# Patient Record
Sex: FEMALE | Race: WHITE | NOT HISPANIC OR LATINO | Employment: UNEMPLOYED | URBAN - NONMETROPOLITAN AREA
[De-identification: names, ages, dates, MRNs, and addresses within clinical notes are randomized per-mention and may not be internally consistent; named-entity substitution may affect disease eponyms.]

---

## 2020-11-17 ENCOUNTER — OFFICE VISIT (OUTPATIENT)
Dept: CARDIOLOGY | Facility: CLINIC | Age: 40
End: 2020-11-17

## 2020-11-17 VITALS
HEART RATE: 79 BPM | WEIGHT: 134 LBS | BODY MASS INDEX: 22.88 KG/M2 | DIASTOLIC BLOOD PRESSURE: 58 MMHG | TEMPERATURE: 97.3 F | HEIGHT: 64 IN | SYSTOLIC BLOOD PRESSURE: 92 MMHG

## 2020-11-17 DIAGNOSIS — R94.31 ABNORMAL EKG: ICD-10-CM

## 2020-11-17 DIAGNOSIS — R42 DIZZINESS: ICD-10-CM

## 2020-11-17 DIAGNOSIS — E11.9 TYPE 2 DIABETES MELLITUS WITHOUT COMPLICATION, WITHOUT LONG-TERM CURRENT USE OF INSULIN (HCC): ICD-10-CM

## 2020-11-17 DIAGNOSIS — R11.2 NAUSEA AND VOMITING IN ADULT: ICD-10-CM

## 2020-11-17 DIAGNOSIS — R06.02 SHORTNESS OF BREATH: ICD-10-CM

## 2020-11-17 DIAGNOSIS — G47.30 SLEEP APNEA IN ADULT: ICD-10-CM

## 2020-11-17 DIAGNOSIS — R07.2 PRECORDIAL PAIN: Primary | ICD-10-CM

## 2020-11-17 DIAGNOSIS — R00.2 PALPITATIONS: ICD-10-CM

## 2020-11-17 DIAGNOSIS — F17.200 SMOKING: ICD-10-CM

## 2020-11-17 PROCEDURE — 93000 ELECTROCARDIOGRAM COMPLETE: CPT | Performed by: INTERNAL MEDICINE

## 2020-11-17 PROCEDURE — 99204 OFFICE O/P NEW MOD 45 MIN: CPT | Performed by: INTERNAL MEDICINE

## 2020-11-17 RX ORDER — ATORVASTATIN CALCIUM 40 MG/1
40 TABLET, FILM COATED ORAL DAILY
COMMUNITY

## 2020-11-17 RX ORDER — IBUPROFEN 800 MG/1
800 TABLET ORAL 3 TIMES DAILY
COMMUNITY

## 2020-11-17 RX ORDER — ALBUTEROL SULFATE 90 UG/1
2 AEROSOL, METERED RESPIRATORY (INHALATION) EVERY 4 HOURS PRN
COMMUNITY

## 2020-11-17 RX ORDER — SPIRONOLACTONE 25 MG/1
25 TABLET ORAL DAILY
COMMUNITY

## 2020-11-17 RX ORDER — TIOTROPIUM BROMIDE INHALATION SPRAY 3.12 UG/1
2 SPRAY, METERED RESPIRATORY (INHALATION) 2 TIMES DAILY
COMMUNITY

## 2020-11-17 RX ORDER — LOSARTAN POTASSIUM 50 MG/1
50 TABLET ORAL DAILY
COMMUNITY
End: 2020-11-17

## 2020-11-17 RX ORDER — DOCUSATE SODIUM 100 MG/1
100 CAPSULE, LIQUID FILLED ORAL 2 TIMES DAILY
COMMUNITY

## 2020-11-17 RX ORDER — GABAPENTIN 800 MG/1
800 TABLET ORAL 3 TIMES DAILY
COMMUNITY

## 2020-11-17 RX ORDER — OMEPRAZOLE 20 MG/1
20 CAPSULE, DELAYED RELEASE ORAL DAILY
COMMUNITY

## 2020-11-17 RX ORDER — LOSARTAN POTASSIUM 50 MG/1
50 TABLET ORAL DAILY
Status: SHIPPED | COMMUNITY
Start: 2020-11-17

## 2020-11-17 RX ORDER — POTASSIUM CHLORIDE 20 MEQ/1
20 TABLET, EXTENDED RELEASE ORAL DAILY
COMMUNITY

## 2020-11-17 RX ORDER — HYDROCODONE BITARTRATE AND ACETAMINOPHEN 10; 325 MG/1; MG/1
1 TABLET ORAL 4 TIMES DAILY
COMMUNITY

## 2020-11-17 RX ORDER — ERGOCALCIFEROL 1.25 MG/1
50000 CAPSULE ORAL WEEKLY
COMMUNITY

## 2020-11-17 RX ORDER — CYCLOBENZAPRINE HCL 10 MG
10 TABLET ORAL 3 TIMES DAILY
COMMUNITY

## 2020-11-17 NOTE — PROGRESS NOTES
"Chief Complaint   Patient presents with   • Establish Care     Patient referred per PCP for tachycardia. Last stress test 2004.   • Chest Pain     Having occasional shooting pains in left chest, lasting 3-5 minutes.   • Shortness of Breath     Having all the time, has gotten worse for the last 6 months. Wears CPAP at night. Notices more shortness of breath when she stands up, becomes weak and dizzy.   • Dizziness     Started about 6 months, states \"heart feels like it is fluttering, I get dizzy and having nausea\". Has been vomiting due to the dizziness for the last 2 months. Having problems with balance for the last 6 months.   • Rapid Heart Rate     Went to [facility] ER about 2 weeks ago due to heart racing. Potassium was 2.2 and dehydration. Having problems with low potassium for the last year.   • Fatigue     Feels tired all the time, exhausted.    • Aspirin     Patient does not take.        CARDIAC COMPLAINTS  chest pressure/discomfort, dyspnea, Dizziness and fatigue      Subjective   Yvonne Carr is a 40 y.o. female came in today for her initial cardiac evaluation.  She has history of hypertension, diabetes who apparently was diagnosed with acute pancreatitis about a year ago.  The cause of the pancreatitis is not known at that time.  Since then she has been having problems keeping her potassium up.  She had multiple episodes where she became extremely hypokalemic.  She has been to the emergency room couple of times and was given IV potassium.  She is now referred because of chest pain, shortness of breath and dizziness along with palpitation.  She has been having this chest pain which is sharp shooting pain in the left part of the chest lasting for few minutes.  It occurs both during exertion as well as at rest.  It is not precipitated by any activities is last for few seconds and then subsides.  She also has been noticing increasing shortness of breath for the last 6 months but now in the last few months has been " getting worse.  She was diagnosed with sleep apnea and uses CPAP at night.  She started noticing that she has to be little bit sitting up before she feels better.  She denies having any PND.  She also has been noticing dizziness in the form of lightheadedness associated with nausea and the room spinning.  She also has been having a lot of vomiting in the last few months.  She has been to the emergency room again with a similar problems and was treated for the same.  She also has history of chronic fatigue which she initially thought was due to sleep apnea but even with the CPAP she continues to have the problem.  Her recent labs showed her blood sugar was very well controlled.  Her potassium is still low but better than before at 3.3.  Her renal function was normal.  Her liver function appears to be normal.  Her magnesium was 1.9.  The blood count was normal with no evidence of anemia.  Her last cardiac work-up was in 2004 when she underwent stress test for pre op evaluation prior to surgery and was told everything was normal.  When I was able to get those report the EF was reported as being little low and there was a questionable apical ischemia.  She does smoke but smokes only less than half a pack.  She has been cutting down on the cigarettes on a wound and hopefully stop it very soon.  She does have a very strong family history of premature coronary artery disease as well as diabetes.    Past Surgical History:   Procedure Laterality Date   • CARDIOVASCULAR STRESS TEST  05/25/2007    @ [facility]. Dr. Lucio- 6 Min. 7.00 METS. 77% THREF 49%. ? Apical Ischemia.       Current Outpatient Medications   Medication Sig Dispense Refill   • albuterol sulfate  (90 Base) MCG/ACT inhaler Inhale 2 puffs Every 4 (Four) Hours As Needed for Wheezing.     • atorvastatin (LIPITOR) 40 MG tablet Take 40 mg by mouth Daily.     • cyclobenzaprine (FLEXERIL) 10 MG tablet Take 10 mg by mouth 3 (Three) Times a Day.     • docusate  sodium (COLACE) 100 MG capsule Take 100 mg by mouth 2 (Two) Times a Day.     • gabapentin (NEURONTIN) 800 MG tablet Take 800 mg by mouth 3 (Three) Times a Day.     • HYDROcodone-acetaminophen (NORCO)  MG per tablet Take 1 tablet by mouth 4 (Four) Times a Day.     • ibuprofen (ADVIL,MOTRIN) 800 MG tablet Take 800 mg by mouth 3 (Three) Times a Day.     • losartan (COZAAR) 50 MG tablet Take 1 tablet by mouth Daily. Reduce it to 1/2 tab     • metFORMIN (GLUCOPHAGE) 500 MG tablet Take 500 mg by mouth 2 (Two) Times a Day With Meals.     • metoprolol tartrate (LOPRESSOR) 25 MG tablet Take 25 mg by mouth Daily.     • omeprazole (priLOSEC) 20 MG capsule Take 20 mg by mouth Daily.     • potassium chloride (K-DUR,KLOR-CON) 20 MEQ CR tablet Take 20 mEq by mouth Daily.     • SITagliptin (JANUVIA) 25 MG tablet Take 25 mg by mouth Daily.     • spironolactone (ALDACTONE) 25 MG tablet Take 25 mg by mouth Daily.     • tiotropium bromide monohydrate (Spiriva Respimat) 2.5 MCG/ACT aerosol solution inhaler Inhale 2 puffs 2 (two) times a day.     • vitamin D (ERGOCALCIFEROL) 1.25 MG (87823 UT) capsule capsule Take 50,000 Units by mouth 1 (One) Time Per Week.       No current facility-administered medications for this visit.            ALLERGIES:  Procardia [nifedipine] and Lexapro [escitalopram oxalate]    Past Medical History:   Diagnosis Date   • Anxiety    • Arthritis    • Asthma    • Bipolar disorder (CMS/[facility])    • Chronic kidney disease     Kidney failure in 2016   • DDD (degenerative disc disease), lumbar    • Depression    • Diabetes mellitus (CMS/HCC)    • History of kidney stones    • History of lumbar fusion    • Hx of cholecystectomy    • Hx of pancreatitis 2019   • Hx of tubal ligation 2005   • Hyperlipidemia    • Hypertension    • Hypokalemia    • Lumbar disc disease    • Reflux esophagitis    • Sleep apnea     wears CPAP       Social History     Tobacco Use   Smoking Status Current Every Day Smoker   • Years: 18.00      • Types: Cigarettes   • Start date: 1994   Smokeless Tobacco Never Used   Tobacco Comment    currently smoking 1 cigarette daily and vaping. Had been smoking 2 packs per day.          Family History   Problem Relation Age of Onset   • Hypertension Mother    • Hyperlipidemia Mother    • Diabetes Mother    • Heart disease Father    • Heart attack Father    • Hyperlipidemia Father    • Hypertension Father    • Asthma Brother    • Dementia Maternal Grandmother    • Hyperlipidemia Maternal Grandmother    • Heart attack Maternal Grandmother    • Stroke Maternal Grandmother    • Heart disease Maternal Grandfather    • Hyperlipidemia Maternal Grandfather    • Hypertension Maternal Grandfather    • Diabetes Maternal Grandfather    • Heart disease Paternal Grandmother    • Hyperlipidemia Paternal Grandmother    • Hypertension Paternal Grandmother    • Heart attack Paternal Grandmother    • Heart disease Paternal Grandfather    • Heart attack Paternal Grandfather    • Hypertension Paternal Grandfather    • Hyperlipidemia Paternal Grandfather    • Diabetes Son        Review of Systems   Constitution: Positive for malaise/fatigue. Negative for decreased appetite.   HENT: Negative for congestion and sore throat.    Eyes: Negative for blurred vision.   Cardiovascular: Positive for chest pain, dyspnea on exertion and palpitations.   Respiratory: Positive for shortness of breath and snoring.    Endocrine: Negative for cold intolerance and heat intolerance.   Hematologic/Lymphatic: Negative for adenopathy. Does not bruise/bleed easily.   Skin: Negative for itching, nail changes and skin cancer.   Musculoskeletal: Negative for arthritis and myalgias.   Gastrointestinal: Negative for abdominal pain, dysphagia and heartburn.   Genitourinary: Negative for bladder incontinence and frequency.   Neurological: Positive for dizziness, light-headedness and vertigo. Negative for seizures.   Psychiatric/Behavioral: Negative for altered mental  "status.   Allergic/Immunologic: Negative for environmental allergies and hives.       Diabetes- Yes  Thyroid- normal    Objective     BP 92/58 (BP Location: Left arm)   Pulse 79   Temp 97.3 °F (36.3 °C)   Ht 162.6 cm (64\")   Wt 60.8 kg (134 lb)   BMI 23.00 kg/m²     Vitals signs and nursing note reviewed.   Constitutional:       Appearance: Healthy appearance.   Eyes:      Pupils: Pupils are equal, round, and reactive to light.   HENT:      Head: Normocephalic.      Nose: Nose normal.    Mouth/Throat:      Pharynx: Oropharynx is clear.   Neck:      Musculoskeletal: Normal range of motion and neck supple.   Pulmonary:      Breath sounds: Normal breath sounds.   Cardiovascular:      PMI at left midclavicular line. Normal rate. Regular rhythm.      Murmurs: There is a grade 3/6 systolic murmur at the apex.   Abdominal:      General: Bowel sounds are normal.      Palpations: Abdomen is soft.   Musculoskeletal: Normal range of motion.   Skin:     General: Skin is warm.   Neurological:      Mental Status: Alert, oriented to person, place, and time and oriented to person, place and time.           ECG 12 Lead    Date/Time: 11/17/2020 1:26 PM  Performed by: Franklyn Barone MD  Authorized by: Franklyn Barone MD   Previous ECG: no previous ECG available  Rhythm: sinus rhythm  Rate: normal  Q waves: V3 and V4    QRS axis: normal  Other findings: non-specific ST-T wave changes and T wave abnormality    Clinical impression: abnormal EKG              Assessment/Plan   Patient's Body mass index is 23 kg/m². BMI is within normal parameters. No follow-up required..     Diagnoses and all orders for this visit:    1. Precordial pain (Primary)  -     Stress Test With Myocardial Perfusion One Day; Future  -     Adult Transthoracic Echo Complete W/ Cont if Necessary Per Protocol; Future    2. Dizziness  -     Stress Test With Myocardial Perfusion One Day; Future  -     Adult Transthoracic Echo Complete W/ Cont if Necessary " Per Protocol; Future    3. Palpitations    4. Nausea and vomiting in adult    5. Type 2 diabetes mellitus without complication, without long-term current use of insulin (CMS/formerly [facility])    6. Smoking    7. Abnormal EKG  -     Stress Test With Myocardial Perfusion One Day; Future    8. Shortness of breath  -     Adult Transthoracic Echo Complete W/ Cont if Necessary Per Protocol; Future    9. Sleep apnea in adult    At baseline her heart rate is stable.  Her blood pressure is lower limit of normal.  Her EKG shows normal sinus rhythm, diffuse T wave changes both in the lateral as well as the inferior leads.  Her clinical examination reveals a BMI of 23.  She has soft systolic murmur at the mitral area.  Normal peripheral pulse and no pedal edema.    The chest pain she describes appears to be atypical but she does have a lot of risk factors and the EKG is abnormal.  She will need to undergo a stress test but since she is not able to walk much secondary to the dizziness lightheadedness and generalized weakness, I will try to get it done as a Lexiscan.    Regarding the shortness of breath it could be related to tobacco use but need to rule out cardiac cause.  I scheduled her to undergo an echocardiogram to evaluate the LV function, valvular structures and PA pressure.    Regarding the dizziness, it could be secondary to the low blood pressure.  Advised her to reduce the dose of Cozaar to half a tablet daily    Regarding the palpitation, she is on a low-dose of beta-blockers and tolerating it well.  After the stress test if she continues to have the palpitation then will put a monitor    Regarding the nausea and vomiting apparently she already had a cholecystectomy done.  Her last CT scan was unremarkable.  She may need to undergo an EGD and possible ERCP or MRCP if continues    Regarding the diabetes, she is taking medications at this time continue the same    Regarding the smoking, I talked to her about the increased risk.   She is trying her best to quit smoking and hope to quit within the next few months.    Regarding the sleep apnea, she is using the CPAP.  I encourage her to continue to do so.    If the stress test is completely normal and if she is still having chest tightness, she can try a low-dose of Bentyl at 10 mg twice daily.    Based on the results, further recommendations will be made.               Electronically signed by Franklyn Barone MD November 17, 2020 13:22 EST

## 2020-11-17 NOTE — PROGRESS NOTES
Yvonne Carr  reports that she has been smoking cigarettes. She started smoking about 26 years ago. She has smoked for the past 18.00 years. She has never used smokeless tobacco.. I have educated her on the risk of diseases from using tobacco products such as COPD and heart disease.     I advised her to quit and she is willing to quit. We have discussed the following method/s for tobacco cessation:  Education Material Counseling.  Together we have set a quit date for 1 week from today.  She will follow up with me in 4 months or sooner to check on her progress.    I spent 3  minutes counseling the patient.

## 2020-11-20 ENCOUNTER — HOSPITAL ENCOUNTER (OUTPATIENT)
Dept: CARDIOLOGY | Facility: HOSPITAL | Age: 40
Discharge: HOME | End: 2020-11-20

## 2020-11-20 DIAGNOSIS — R42 DIZZINESS: ICD-10-CM

## 2020-11-20 DIAGNOSIS — R07.2 PRECORDIAL PAIN: ICD-10-CM

## 2020-11-20 DIAGNOSIS — R94.31 ABNORMAL EKG: ICD-10-CM

## 2020-11-20 DIAGNOSIS — R06.02 SHORTNESS OF BREATH: ICD-10-CM

## 2020-11-20 PROCEDURE — 0 TECHNETIUM SESTAMIBI: Performed by: INTERNAL MEDICINE

## 2020-11-20 PROCEDURE — 93356 MYOCRD STRAIN IMG SPCKL TRCK: CPT

## 2020-11-20 PROCEDURE — 93306 TTE W/DOPPLER COMPLETE: CPT

## 2020-11-20 PROCEDURE — 93016 CV STRESS TEST SUPVJ ONLY: CPT | Performed by: NURSE PRACTITIONER

## 2020-11-20 PROCEDURE — A9500 TC99M SESTAMIBI: HCPCS | Performed by: INTERNAL MEDICINE

## 2020-11-20 PROCEDURE — 93018 CV STRESS TEST I&R ONLY: CPT | Performed by: INTERNAL MEDICINE

## 2020-11-20 PROCEDURE — 93356 MYOCRD STRAIN IMG SPCKL TRCK: CPT | Performed by: INTERNAL MEDICINE

## 2020-11-20 PROCEDURE — 78452 HT MUSCLE IMAGE SPECT MULT: CPT | Performed by: INTERNAL MEDICINE

## 2020-11-20 PROCEDURE — 25010000002 REGADENOSON 0.4 MG/5ML SOLUTION

## 2020-11-20 PROCEDURE — 93306 TTE W/DOPPLER COMPLETE: CPT | Performed by: INTERNAL MEDICINE

## 2020-11-20 PROCEDURE — 93017 CV STRESS TEST TRACING ONLY: CPT

## 2020-11-20 PROCEDURE — 78452 HT MUSCLE IMAGE SPECT MULT: CPT

## 2020-11-20 RX ADMIN — TECHNETIUM TC 99M SESTAMIBI 1 DOSE: 1 INJECTION INTRAVENOUS at 10:15

## 2020-11-20 RX ADMIN — TECHNETIUM TC 99M SESTAMIBI 1 DOSE: 1 INJECTION INTRAVENOUS at 08:57

## 2020-11-21 LAB
BH CV ECHO MEAS - ACS: 2 CM
BH CV ECHO MEAS - AO MAX PG: 4.5 MMHG
BH CV ECHO MEAS - AO MEAN PG: 3 MMHG
BH CV ECHO MEAS - AO ROOT AREA (BSA CORRECTED): 1.6
BH CV ECHO MEAS - AO ROOT AREA: 5.7 CM^2
BH CV ECHO MEAS - AO ROOT DIAM: 2.7 CM
BH CV ECHO MEAS - AO V2 MAX: 106 CM/SEC
BH CV ECHO MEAS - AO V2 MEAN: 74.3 CM/SEC
BH CV ECHO MEAS - AO V2 VTI: 19 CM
BH CV ECHO MEAS - BSA(HAYCOCK): 1.7 M^2
BH CV ECHO MEAS - BSA: 1.6 M^2
BH CV ECHO MEAS - BZI_BMI: 23 KILOGRAMS/M^2
BH CV ECHO MEAS - BZI_METRIC_HEIGHT: 162.6 CM
BH CV ECHO MEAS - BZI_METRIC_WEIGHT: 60.8 KG
BH CV ECHO MEAS - EDV(CUBED): 58 ML
BH CV ECHO MEAS - EDV(MOD-SP4): 57 ML
BH CV ECHO MEAS - EDV(TEICH): 64.7 ML
BH CV ECHO MEAS - EF(CUBED): 56.6 %
BH CV ECHO MEAS - EF(MOD-SP4): 51.4 %
BH CV ECHO MEAS - EF(TEICH): 48.9 %
BH CV ECHO MEAS - ESV(CUBED): 25.2 ML
BH CV ECHO MEAS - ESV(MOD-SP4): 27.7 ML
BH CV ECHO MEAS - ESV(TEICH): 33 ML
BH CV ECHO MEAS - FS: 24.3 %
BH CV ECHO MEAS - IVS/LVPW: 0.87
BH CV ECHO MEAS - IVSD: 0.81 CM
BH CV ECHO MEAS - LA DIMENSION: 2.8 CM
BH CV ECHO MEAS - LA/AO: 1
BH CV ECHO MEAS - LV DIASTOLIC VOL/BSA (35-75): 34.5 ML/M^2
BH CV ECHO MEAS - LV IVRT: 0.12 SEC
BH CV ECHO MEAS - LV MASS(C)D: 99.7 GRAMS
BH CV ECHO MEAS - LV MASS(C)DI: 60.4 GRAMS/M^2
BH CV ECHO MEAS - LV SYSTOLIC VOL/BSA (12-30): 16.8 ML/M^2
BH CV ECHO MEAS - LVIDD: 3.9 CM
BH CV ECHO MEAS - LVIDS: 2.9 CM
BH CV ECHO MEAS - LVLD AP4: 5.6 CM
BH CV ECHO MEAS - LVLS AP4: 5 CM
BH CV ECHO MEAS - LVOT AREA (M): 3.1 CM^2
BH CV ECHO MEAS - LVOT AREA: 3.1 CM^2
BH CV ECHO MEAS - LVOT DIAM: 2 CM
BH CV ECHO MEAS - LVPWD: 0.94 CM
BH CV ECHO MEAS - MV A MAX VEL: 48.4 CM/SEC
BH CV ECHO MEAS - MV DEC SLOPE: 138 CM/SEC^2
BH CV ECHO MEAS - MV E MAX VEL: 52.3 CM/SEC
BH CV ECHO MEAS - MV E/A: 1.1
BH CV ECHO MEAS - RVDD: 2.5 CM
BH CV ECHO MEAS - SI(AO): 65.9 ML/M^2
BH CV ECHO MEAS - SI(CUBED): 19.9 ML/M^2
BH CV ECHO MEAS - SI(MOD-SP4): 17.8 ML/M^2
BH CV ECHO MEAS - SI(TEICH): 19.2 ML/M^2
BH CV ECHO MEAS - SV(AO): 108.8 ML
BH CV ECHO MEAS - SV(CUBED): 32.8 ML
BH CV ECHO MEAS - SV(MOD-SP4): 29.3 ML
BH CV ECHO MEAS - SV(TEICH): 31.7 ML
BH CV NUCLEAR PRIOR STUDY: 3
BH CV STRESS COMMENTS STAGE 1: NORMAL
BH CV STRESS DOSE REGADENOSON STAGE 1: 0.4
BH CV STRESS DURATION MIN STAGE 1: 0
BH CV STRESS DURATION SEC STAGE 1: 10
BH CV STRESS PROTOCOL 1: NORMAL
BH CV STRESS RECOVERY BP: NORMAL MMHG
BH CV STRESS RECOVERY HR: 78 BPM
BH CV STRESS STAGE 1: 1
LV EF NUC BP: 59 %
MAXIMAL PREDICTED HEART RATE: 181 BPM
MAXIMAL PREDICTED HEART RATE: 181 BPM
PERCENT MAX PREDICTED HR: 42.54 %
STRESS BASELINE BP: NORMAL MMHG
STRESS BASELINE HR: 71 BPM
STRESS PERCENT HR: 50 %
STRESS POST PEAK BP: NORMAL MMHG
STRESS POST PEAK HR: 77 BPM
STRESS TARGET HR: 154 BPM
STRESS TARGET HR: 154 BPM

## 2020-11-23 ENCOUNTER — TELEPHONE (OUTPATIENT)
Dept: CARDIOLOGY | Facility: CLINIC | Age: 40
End: 2020-11-23

## 2020-11-23 NOTE — TELEPHONE ENCOUNTER
"While speaking with patient on stress test results, patient wanted to make you aware she stopped taking Losartan due to low B/P and dizziness\". She reports feeling much better and no further problems.  "

## 2024-04-08 ENCOUNTER — OFFICE VISIT (OUTPATIENT)
Dept: ENDOCRINOLOGY | Facility: CLINIC | Age: 44
End: 2024-04-08
Payer: COMMERCIAL

## 2024-04-08 VITALS
HEART RATE: 76 BPM | BODY MASS INDEX: 24.69 KG/M2 | SYSTOLIC BLOOD PRESSURE: 126 MMHG | DIASTOLIC BLOOD PRESSURE: 78 MMHG | OXYGEN SATURATION: 97 % | HEIGHT: 64 IN | RESPIRATION RATE: 20 BRPM | TEMPERATURE: 98.1 F | WEIGHT: 144.6 LBS

## 2024-04-08 DIAGNOSIS — E04.2 NONTOXIC MULTINODULAR GOITER: Primary | ICD-10-CM

## 2024-04-08 PROCEDURE — 1159F MED LIST DOCD IN RCRD: CPT | Performed by: INTERNAL MEDICINE

## 2024-04-08 PROCEDURE — 1160F RVW MEDS BY RX/DR IN RCRD: CPT | Performed by: INTERNAL MEDICINE

## 2024-04-08 PROCEDURE — 99203 OFFICE O/P NEW LOW 30 MIN: CPT | Performed by: INTERNAL MEDICINE

## 2024-04-08 RX ORDER — DOXEPIN HYDROCHLORIDE 50 MG/1
CAPSULE ORAL
COMMUNITY
Start: 2024-03-15

## 2024-04-08 RX ORDER — HYDROXYZINE HYDROCHLORIDE 25 MG/1
TABLET, FILM COATED ORAL
COMMUNITY
Start: 2024-03-15

## 2024-04-08 RX ORDER — FLUTICASONE PROPIONATE 50 MCG
SPRAY, SUSPENSION (ML) NASAL
COMMUNITY
Start: 2024-03-15

## 2024-04-08 RX ORDER — THIAMINE HCL 50 MG
TABLET ORAL
COMMUNITY
Start: 2024-03-15

## 2024-04-08 RX ORDER — CHOLECALCIFEROL (VITAMIN D3) 1250 MCG
CAPSULE ORAL
COMMUNITY
Start: 2024-03-15

## 2024-04-08 RX ORDER — PLECANATIDE 3 MG/1
TABLET ORAL
COMMUNITY
Start: 2024-03-15

## 2024-04-08 RX ORDER — FENOFIBRATE 54 MG/1
TABLET ORAL
COMMUNITY
Start: 2024-04-01

## 2024-04-08 RX ORDER — PANTOPRAZOLE SODIUM 40 MG/1
40 TABLET, DELAYED RELEASE ORAL
COMMUNITY

## 2024-04-08 RX ORDER — LIDOCAINE 50 MG/G
PATCH TOPICAL
COMMUNITY
Start: 2024-03-15

## 2024-04-08 RX ORDER — TRIAMCINOLONE ACETONIDE 1 MG/G
OINTMENT TOPICAL
COMMUNITY
Start: 2024-03-08

## 2024-04-08 RX ORDER — BLOOD-GLUCOSE METER
KIT MISCELLANEOUS
COMMUNITY
Start: 2024-02-02

## 2024-04-08 RX ORDER — POTASSIUM CHLORIDE 750 MG/1
TABLET, FILM COATED, EXTENDED RELEASE ORAL
COMMUNITY
Start: 2024-03-15

## 2024-04-08 RX ORDER — FERROUS SULFATE 325(65) MG
TABLET ORAL
COMMUNITY
Start: 2024-03-15

## 2024-04-08 RX ORDER — FLUTICASONE PROPIONATE AND SALMETEROL XINAFOATE 115; 21 UG/1; UG/1
AEROSOL, METERED RESPIRATORY (INHALATION)
COMMUNITY
Start: 2024-03-22

## 2024-04-08 RX ORDER — FOLIC ACID 1 MG/1
TABLET ORAL
COMMUNITY
Start: 2024-03-15

## 2024-04-08 RX ORDER — METOPROLOL SUCCINATE 25 MG/1
25 TABLET, EXTENDED RELEASE ORAL
COMMUNITY

## 2024-04-08 RX ORDER — BUSPIRONE HYDROCHLORIDE 15 MG/1
15 TABLET ORAL
COMMUNITY

## 2024-04-08 RX ORDER — PITAVASTATIN CALCIUM 2.09 MG/1
TABLET, FILM COATED ORAL
COMMUNITY
Start: 2024-03-15

## 2024-04-08 RX ORDER — LANCETS 28 GAUGE
EACH MISCELLANEOUS
COMMUNITY
Start: 2024-03-22

## 2024-04-08 NOTE — PROGRESS NOTES
"     Office Note      Date: 2024  Patient Name: Yvonne Carr  MRN: [id number]  : 1980    Chief Complaint   Patient presents with    [facility]    Thyroid Problem       History of Present Illness:   Yvonne Carr is a 43 y.o. female who presents for [facility] and Thyroid Problem  .   Patient is seen for a new patient evaluation  She was first found to have thyroid nodules in / She had been really ill in  with covid. She was on ventilator for an extended period of time and then in the nursing home.   Ultrasound in  showed multinodular goiter  with some of the  nodules getting  slightly larger compared to    Tft's were normal.       Subjective          Review of Systems:   Review of Systems   Constitutional:  Positive for fatigue.   HENT:  Positive for trouble swallowing and voice change.    Respiratory:  Positive for cough and shortness of breath.    Neurological:  Positive for tremors and numbness.       The following portions of the patient's history were reviewed and updated as appropriate: allergies, current medications, past family history, past medical history, past social history, past surgical history, and problem list.    Objective     Visit Vitals  /78   Pulse 76   Temp 98.1 °F (36.7 °C)   Resp 20   Ht 162.6 cm (64.02\")   Wt 65.6 kg (144 lb 9.6 oz)   SpO2 97%   BMI 24.81 kg/m²           Physical Exam:  Physical Exam  Vitals reviewed.   Constitutional:       Appearance: Normal appearance. She is ill-appearing.   HENT:      Head: Normocephalic and atraumatic.   Eyes:      Extraocular Movements: Extraocular movements intact.   Neck:      Comments: Irregular, firm thyroid without dominant nodules  Cardiovascular:      Rate and Rhythm: Normal rate.   Pulmonary:      Effort: Pulmonary effort is normal.   Lymphadenopathy:      Cervical: No cervical adenopathy.   Neurological:      Mental Status: She is alert.   Psychiatric:         Mood and Affect: Mood normal.         " Thought Content: Thought content normal.         Judgment: Judgment normal.         Assessment / Plan      Assessment & Plan:  Problem List Items Addressed This Visit       Nontoxic multinodular goiter - Primary    Overview     She was first found to have thyroid nodules in 2021/ She had been really ill in 2020 with covid. She was on ventilator for an extended period of time and then in the nursing home.   Ultrasound in 2023 showed multinodular goiter  with some of the  nodules getting  slightly larger compared to 2021   Tft's were normal.     [place]  No hi iodine intake  No facial radiation exposure  Family hx- no  one known to have thyroid disease          Current Assessment & Plan     She has a mng with none of the nodules meeting criteria for FNA   The gland is not particularly symptomatic  The hoarseness and dysphagia are due to gerd almost certainly     Plan: will repeat ultrasound and tfts in the fall          Relevant Medications    metoprolol tartrate (LOPRESSOR) 25 MG tablet    metoprolol succinate XL (TOPROL-XL) 25 MG 24 hr tablet        Electronically signed by  : Moy Ye MD   04/08/2024

## 2024-04-08 NOTE — ASSESSMENT & PLAN NOTE
She has a mng with none of the nodules meeting criteria for FNA   The gland is not particularly symptomatic  The hoarseness and dysphagia are due to gerd almost certainly     Plan: will repeat ultrasound and tfts in the fall

## 2024-10-24 ENCOUNTER — OFFICE VISIT (OUTPATIENT)
Dept: ENDOCRINOLOGY | Facility: CLINIC | Age: 44
End: 2024-10-24
Payer: COMMERCIAL

## 2024-10-24 VITALS
DIASTOLIC BLOOD PRESSURE: 82 MMHG | HEART RATE: 85 BPM | BODY MASS INDEX: 25.94 KG/M2 | SYSTOLIC BLOOD PRESSURE: 127 MMHG | WEIGHT: 151.2 LBS | OXYGEN SATURATION: 95 %

## 2024-10-24 DIAGNOSIS — E83.51 HYPOCALCEMIA: ICD-10-CM

## 2024-10-24 DIAGNOSIS — E04.2 NONTOXIC MULTINODULAR GOITER: Primary | ICD-10-CM

## 2024-10-24 LAB
ALBUMIN SERPL-MCNC: 4.2 G/DL (ref 3.5–5.2)
ALBUMIN/GLOB SERPL: 1.4 G/DL
ALP SERPL-CCNC: 29 U/L (ref 39–117)
ALT SERPL W P-5'-P-CCNC: 16 U/L (ref 1–33)
ANION GAP SERPL CALCULATED.3IONS-SCNC: 12.8 MMOL/L (ref 5–15)
AST SERPL-CCNC: 28 U/L (ref 1–32)
BILIRUB SERPL-MCNC: 0.2 MG/DL (ref 0–1.2)
BUN SERPL-MCNC: 5 MG/DL (ref 6–20)
BUN/CREAT SERPL: 4.6 (ref 7–25)
CALCIUM SPEC-SCNC: 9.1 MG/DL (ref 8.6–10.5)
CHLORIDE SERPL-SCNC: 106 MMOL/L (ref 98–107)
CO2 SERPL-SCNC: 18.2 MMOL/L (ref 22–29)
CREAT SERPL-MCNC: 1.08 MG/DL (ref 0.57–1)
EGFRCR SERPLBLD CKD-EPI 2021: 65.9 ML/MIN/1.73
GLOBULIN UR ELPH-MCNC: 2.9 GM/DL
GLUCOSE SERPL-MCNC: 93 MG/DL (ref 65–99)
POTASSIUM SERPL-SCNC: 4.4 MMOL/L (ref 3.5–5.2)
PROT SERPL-MCNC: 7.1 G/DL (ref 6–8.5)
SODIUM SERPL-SCNC: 137 MMOL/L (ref 136–145)
TSH SERPL DL<=0.05 MIU/L-ACNC: 2.35 UIU/ML (ref 0.27–4.2)

## 2024-10-24 PROCEDURE — 80053 COMPREHEN METABOLIC PANEL: CPT | Performed by: INTERNAL MEDICINE

## 2024-10-24 PROCEDURE — 84443 ASSAY THYROID STIM HORMONE: CPT | Performed by: INTERNAL MEDICINE

## 2024-10-24 NOTE — PROGRESS NOTES
Office Note      Date: 10/24/2024  Patient Name: Yvonne Carr  MRN: [id number]  : 1980    Chief Complaint   Patient presents with    Follow-up     Follow up and ultra sound      For goiter   History of Present Illness:   Yvonne Carr is a 43 y.o. female who presents for Follow-up (Follow up and ultra sound )  .   Current rx:not on any thyorid medication     Changes in history:notes her hands drawing up painfully on occassion  Questions /problems: see above     Subjective          Review of Systems:   Review of Systems   HENT:  Positive for trouble swallowing and voice change.    Neurological:         Muscle spasms       The following portions of the patient's history were reviewed and updated as appropriate: allergies, current medications, past family history, past medical history, past social history, past surgical history, and problem list.    Objective     Visit Vitals  /82 (BP Location: Right arm, Patient Position: Sitting, Cuff Size: Adult)   Pulse 85   Wt 68.6 kg (151 lb 3.2 oz)   SpO2 95%   BMI 25.94 kg/m²           Physical Exam:  Physical Exam  Vitals reviewed.   Constitutional:       Appearance: Normal appearance.   Neurological:      Mental Status: She is alert.   Psychiatric:         Mood and Affect: Mood normal.         Thought Content: Thought content normal.         Judgment: Judgment normal.       Assessment / Plan      Assessment & Plan:  Problem List Items Addressed This Visit       Nontoxic multinodular goiter - Primary    Overview     She was first found to have thyroid nodules in / She had been really ill in  with covid. She was on ventilator for an extended period of time and then in the nursing home.   Ultrasound in  showed multinodular goiter  with some of the  nodules getting  slightly larger compared to    Tft's were normal.     [place]  was [place]  No hi iodine intake  No facial radiation exposure  Family hx- no  one known to have thyroid disease      ===================================  10/24/24/  Procedure: thyroid ultrasound  Indication: hx of nodules  Results- the overall size of the thyroid is normal  There are scattered small sub cm nodules in the right lobe/ nothing for which FNA is required.  In the left lobe are 3  sub cm hypoechoic nodules . They are less than 1 cm  and unchanged from prior.. recommend repeat ultrasound in a year          Current Assessment & Plan     Stable mng. Plan : recheck ultrasound in a year          Relevant Medications    metoprolol tartrate (LOPRESSOR) 25 MG tablet    metoprolol succinate XL (TOPROL-XL) 25 MG 24 hr tablet    Other Relevant Orders    TSH    US Thyroid    Hypocalcemia    Relevant Orders    Comprehensive Metabolic Panel        Electronically signed by : Moy Ye MD   10/24/2024